# Patient Record
Sex: FEMALE | Race: WHITE | NOT HISPANIC OR LATINO | Employment: OTHER | URBAN - METROPOLITAN AREA
[De-identification: names, ages, dates, MRNs, and addresses within clinical notes are randomized per-mention and may not be internally consistent; named-entity substitution may affect disease eponyms.]

---

## 2023-08-28 ENCOUNTER — TELEPHONE (OUTPATIENT)
Dept: SURGERY | Facility: CLINIC | Age: 78
End: 2023-08-28
Payer: OTHER GOVERNMENT

## 2023-08-28 NOTE — TELEPHONE ENCOUNTER
New patient chart prepared for Dr. Marzena Benedict to review.   New patient referral for personal history of breast cancer-[place].     Left patient a message to call me back.

## 2023-08-30 ENCOUNTER — TELEPHONE (OUTPATIENT)
Dept: SURGERY | Facility: CLINIC | Age: 78
End: 2023-08-30
Payer: OTHER GOVERNMENT

## 2023-08-30 NOTE — TELEPHONE ENCOUNTER
New patient apt scheduled with Dr. Jeni Bojorquez Mon 11/6/23 10:00 am.     New patient packet and apt reminder mailed.

## 2023-11-03 NOTE — PROGRESS NOTES
Breast Surgery:    Oncologic History:  Kaye Aviles is a 78 y.o. female with the following oncologic history:  Oncology/Hematology History   Ductal carcinoma in situ (DCIS) of right breast   8/24/2018 Cancer Staged    Staging form: Breast, AJCC 8th Edition  - Pathologic stage from 8/24/2018: Stage 0 (pTis (DCIS), pN0, cM0, ER-, ND-, HER2: Not Assessed) - Signed by Hanane Bojorquez MD on 11/6/2023 8/24/2018 Surgery    Surgery       Procedure:  Right Radioactive seed localized partial mastectomy      Location:  right      Completeness of resection:   negative margins       10/1/2018 - 12/1/2018 Radiation    Radiation OncologyTreatment Course:  Kaye Aviles received radiation following RIGHT PM, but unknown length of time/total grey received.      7/18/2022 Cancer Staged    Staging form: Breast, AJCC 8th Edition  - Pathologic stage from 9/9/2022: Stage 0 (rpTis (DCIS), pN0(sn), cM0, ER-, ND-, HER2: Not Assessed) - Signed by Hanane Bojorquez MD on 11/6/2023 9/9/2022 Surgery    Surgery       Procedure:  Bilateral Mastectomy, right axillary sentinel lymph node biopsy      Location:  Right DCIS      Completeness of resection:   negative margins       11/6/2023 Initial Diagnosis    Ductal carcinoma in situ (DCIS) of right breast          Interval History: She recently moved from [place] to [place], here today to establish follow up for clinical breast exams. She was initially diagnosed with HR- DCIS in 2018, completed partial mastectomy and radiation. She then had a new HR -, DCIS diagnosis in 2022 and underwent bilateral mastectomies, right sentinel lymph node biopsy with flat closure.     She is otherwise doing well. She has had chronic right sided pain since her initial surgery in 2018, noticed when laying on that side, otherwise it does not bother her. She enjoys walks, attending music events. She and her  moved to the [place] area to be near family after they both had cancer diagnoses recently. Son  is an .    She declined genetic testing when previously offered. Her family does not want to know the results if a mutation were to be found.     GYNECOLOGIC HISTORY:   . P: 2. AB: 2.  Last menstrual period: na  Age at menarche: 12  Age at first childbirth: 29  Lactation: yes  Age at menopause: 49  Total years of oral contraceptive use: 9  Total years of hormone replacement therapy: 2      Past Medical History:   Diagnosis Date    Breast cancer     Hyperlipidemia      Patient Active Problem List   Diagnosis    History of ductal carcinoma in situ of breast    Ductal carcinoma in situ (DCIS) of right breast     Current Outpatient Medications on File Prior to Visit   Medication Sig Dispense Refill    ezetimibe (ZETIA) 10 MG tablet Take 1 tablet by mouth Daily.      ALPRAZolam (XANAX) 0.25 MG tablet Take 1 tablet by mouth Daily As Needed.      Aspirin 325 MG capsule TAKE 1/2 TABLET BY ORAL ROUTE EVERY OTHER DAY      atenolol (TENORMIN) 25 MG tablet TAKE 0.5 TABLETS TWICE A DAY BY ORAL ROUTE FOR 90 DAYS.      Cholecalciferol 50 MCG (2000 UT) tablet Take by oral route.      clonazePAM (KlonoPIN) 1 MG tablet Take 0.5 tablets by mouth 2 (Two) Times a Day.      colesevelam (WELCHOL) 3.75 g pack pack USE 1 PACKET IN LIQUID AND DRINK BY MOUTH EVERY DAY      Diclofenac Sodium (VOLTAREN) 1 % gel gel apply 2-4 gm to affected areas QID      QUEtiapine (SEROquel) 25 MG tablet TAKE 1/4 TO 1/2 TABLET BY MOUTH AT BEDTIME AS NEEDED       No current facility-administered medications on file prior to visit.     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     Family History   Problem Relation Age of Onset    Breast cancer Mother 74    Cancer Father         Review of Systems:  CONSTITUTIONAL: No weight loss, fever, chills, weakness or fatigue.  HEENT: Eyes: No visual loss, blurred vision, double vision or yellow sclerae. Ears, Nose, Throat: No hearing loss, sneezing, congestion, runny nose or sore throat.  SKIN: No rash or itching.  CARDIOVASCULAR: No chest pain, chest pressure or chest discomfort. No palpitations or edema.  RESPIRATORY: No shortness of breath, cough or sputum.  GASTROINTESTINAL: No anorexia, nausea, vomiting or diarrhea. No abdominal pain or blood.  GENITOURINARY: No burning on urination  NEUROLOGICAL: No headache, dizziness, syncope, paralysis, ataxia, numbness or tingling in the extremities. No change in bowel or bladder control.  MUSCULOSKELETAL: No muscle, back pain, joint pain or stiffness.  HEMATOLOGIC: No anemia, bleeding or bruising.  LYMPHATICS: No enlarged nodes.  PSYCHIATRIC: No history of depression or anxiety.  ENDOCRINOLOGIC: No reports of sweating, cold or heat intolerance.   ALLERGIES: No history of asthma, hives, eczema or rhinitis.    Physical Exam:  Vitals:    11/06/23 0942   BP: 148/78   Pulse: 64   SpO2: 99%         General: Alert, cooperative    HEENT: Atraumatic, normocephalic    Oral/Maxillofacial: Moist mucous membranes    Neck: Supple     Lungs: EWOB, clear to auscultation bilaterally     Heart: RRR    Breast: bilateral flat closure of mastectomies, incisions well healed     Lymphatics:  Bilateral supraclavicular, cervical, and axillary basins without lymphadneopathy    Abdomen: Soft, non-tender, non-distended    Extremities: normal strength and sensation.  No obvious deformities.     Neuro: alert, normal speech, no focal findings or movement disorder  noted     Skin: no lesions or abrasions. Sks to left chest wall.       Recent Imaging:  NA    Assessment/Plan: Kaye Aviles is a 78 y.o.female with h/o Stage 0 ER-DC-, Right breast cancer, JEANNETTE  - RTC 6 months for clinical breast exam  - Referral to nurse navigation for support, peer and psych.     Hanane Bojorquez MD  Breast Surgical Oncology

## 2023-11-06 ENCOUNTER — OFFICE VISIT (OUTPATIENT)
Dept: SURGERY | Facility: CLINIC | Age: 78
End: 2023-11-06
Payer: MEDICARE

## 2023-11-06 ENCOUNTER — PATIENT OUTREACH (OUTPATIENT)
Dept: OTHER | Facility: HOSPITAL | Age: 78
End: 2023-11-06
Payer: OTHER GOVERNMENT

## 2023-11-06 VITALS
WEIGHT: 120 LBS | BODY MASS INDEX: 23.56 KG/M2 | HEART RATE: 64 BPM | HEIGHT: 60 IN | DIASTOLIC BLOOD PRESSURE: 78 MMHG | OXYGEN SATURATION: 99 % | SYSTOLIC BLOOD PRESSURE: 148 MMHG

## 2023-11-06 DIAGNOSIS — Z86.000 HISTORY OF DUCTAL CARCINOMA IN SITU OF BREAST: Primary | ICD-10-CM

## 2023-11-06 DIAGNOSIS — D05.11 DUCTAL CARCINOMA IN SITU (DCIS) OF RIGHT BREAST: ICD-10-CM

## 2023-11-06 PROCEDURE — 99203 OFFICE O/P NEW LOW 30 MIN: CPT | Performed by: STUDENT IN AN ORGANIZED HEALTH CARE EDUCATION/TRAINING PROGRAM

## 2023-11-06 PROCEDURE — 1159F MED LIST DOCD IN RCRD: CPT | Performed by: STUDENT IN AN ORGANIZED HEALTH CARE EDUCATION/TRAINING PROGRAM

## 2023-11-06 PROCEDURE — 1160F RVW MEDS BY RX/DR IN RCRD: CPT | Performed by: STUDENT IN AN ORGANIZED HEALTH CARE EDUCATION/TRAINING PROGRAM

## 2023-11-06 RX ORDER — EZETIMIBE 10 MG/1
1 TABLET ORAL DAILY
COMMUNITY
Start: 2023-07-24

## 2023-11-06 RX ORDER — COLESEVELAM HYDROCHLORIDE 3.75 G/1
POWDER, FOR SUSPENSION ORAL
COMMUNITY

## 2023-11-06 RX ORDER — QUETIAPINE FUMARATE 25 MG/1
TABLET, FILM COATED ORAL
COMMUNITY

## 2023-11-06 RX ORDER — CLONAZEPAM 1 MG/1
0.5 TABLET ORAL 2 TIMES DAILY
COMMUNITY

## 2023-11-06 RX ORDER — ATENOLOL 25 MG/1
TABLET ORAL
COMMUNITY

## 2023-11-06 RX ORDER — ALPRAZOLAM 0.25 MG/1
1 TABLET ORAL DAILY PRN
COMMUNITY

## 2023-11-06 NOTE — LETTER
November 6, 2023       No Recipients    Patient: Kaye Aviles   YOB: 1945   Date of Visit: 11/6/2023     Dear Montrell Ye MD:       Thank you for referring Kaye Aviles to me for evaluation. Below are the relevant portions of my assessment and plan of care.    If you have questions, please do not hesitate to call me. I look forward to following Kaye along with you.         Sincerely,        Hanane Bojorquez MD        CC:   No Recipients    Hanane Bojorquez MD  11/06/23 1041  Sign when Signing Visit  Breast Surgery:    Oncologic History:  Kaye Aviles is a 78 y.o. female with the following oncologic history:  Oncology/Hematology History   Ductal carcinoma in situ (DCIS) of right breast   8/24/2018 Cancer Staged    Staging form: Breast, AJCC 8th Edition  - Pathologic stage from 8/24/2018: Stage 0 (pTis (DCIS), pN0, cM0, ER-, AK-, HER2: Not Assessed) - Signed by Hanane Bojorquez MD on 11/6/2023 8/24/2018 Surgery    Surgery       Procedure:  Right Radioactive seed localized partial mastectomy      Location:  right      Completeness of resection:   negative margins       10/1/2018 - 12/1/2018 Radiation    Radiation OncologyTreatment Course:  Kaye Aviles received radiation following RIGHT PM, but unknown length of time/total grey received.      7/18/2022 Cancer Staged    Staging form: Breast, AJCC 8th Edition  - Pathologic stage from 9/9/2022: Stage 0 (rpTis (DCIS), pN0(sn), cM0, ER-, AK-, HER2: Not Assessed) - Signed by Haanne Bojorquez MD on 11/6/2023 9/9/2022 Surgery    Surgery       Procedure:  Bilateral Mastectomy, right axillary sentinel lymph node biopsy      Location:  Right DCIS      Completeness of resection:   negative margins       11/6/2023 Initial Diagnosis    Ductal carcinoma in situ (DCIS) of right breast          Interval History: She recently moved from [place] to [place], here today to establish follow up for clinical breast exams. She was initially diagnosed with HR- DCIS in  2018, completed partial mastectomy and radiation. She then had a new HR -, DCIS diagnosis in  and underwent bilateral mastectomies, right sentinel lymph node biopsy with flat closure.     She is otherwise doing well. She has had chronic right sided pain since her initial surgery in 2018, noticed when laying on that side, otherwise it does not bother her. She enjoys walks, attending music events. She and her  moved to the [facility] to be near family after they both had cancer diagnoses recently. Son is an .    She declined genetic testing when previously offered. Her family does not want to know the results if a mutation were to be found.     GYNECOLOGIC HISTORY:   . P: 2. AB: 2.  Last menstrual period: na  Age at menarche: 12  Age at first childbirth: 29  Lactation: yes  Age at menopause: 49  Total years of oral contraceptive use: 9  Total years of hormone replacement therapy: 2      Past Medical History:   Diagnosis Date   • Breast cancer    • Hyperlipidemia      Patient Active Problem List   Diagnosis   • History of ductal carcinoma in situ of breast   • Ductal carcinoma in situ (DCIS) of right breast     Current Outpatient Medications on File Prior to Visit   Medication Sig Dispense Refill   • ezetimibe (ZETIA) 10 MG tablet Take 1 tablet by mouth Daily.     • ALPRAZolam (XANAX) 0.25 MG tablet Take 1 tablet by mouth Daily As Needed.     • Aspirin 325 MG capsule TAKE 1/2 TABLET BY ORAL ROUTE EVERY OTHER DAY     • atenolol (TENORMIN) 25 MG tablet TAKE 0.5 TABLETS TWICE A DAY BY ORAL ROUTE FOR 90 DAYS.     • Cholecalciferol 50 MCG (2000 UT) tablet Take by oral route.     • clonazePAM (KlonoPIN) 1 MG tablet Take 0.5 tablets by mouth 2 (Two) Times a Day.     • colesevelam (WELCHOL) 3.75 g pack pack USE 1 PACKET IN LIQUID AND DRINK BY MOUTH EVERY DAY     • Diclofenac Sodium (VOLTAREN) 1 % gel gel apply 2-4 gm to affected areas QID     • QUEtiapine (SEROquel) 25 MG tablet TAKE  1/4 TO 1/2 TABLET BY MOUTH AT BEDTIME AS NEEDED       No current facility-administered medications on file prior to visit.     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     Family History   Problem Relation Age of Onset   • Breast cancer Mother 74   • Cancer Father         Review of Systems:  CONSTITUTIONAL: No weight loss, fever, chills, weakness or fatigue.  HEENT: Eyes: No visual loss, blurred vision, double vision or yellow sclerae. Ears, Nose, Throat: No hearing loss, sneezing, congestion, runny nose or sore throat.  SKIN: No rash or itching.  CARDIOVASCULAR: No chest pain, chest pressure or chest discomfort. No palpitations or edema.  RESPIRATORY: No shortness of breath, cough or sputum.  GASTROINTESTINAL: No anorexia, nausea, vomiting or diarrhea. No abdominal pain or blood.  GENITOURINARY: No burning on urination  NEUROLOGICAL: No headache, dizziness, syncope, paralysis, ataxia, numbness or tingling in the extremities. No change in bowel or bladder control.  MUSCULOSKELETAL: No muscle, back pain, joint pain or stiffness.  HEMATOLOGIC: No anemia, bleeding or bruising.  LYMPHATICS: No enlarged nodes.  PSYCHIATRIC: No history of depression or anxiety.  ENDOCRINOLOGIC: No reports of sweating, cold or heat intolerance.   ALLERGIES: No history of asthma, hives, eczema or rhinitis.    Physical Exam:  Vitals:    23 0942   BP: 148/78   Pulse: 64   SpO2: 99%         General: Alert,  cooperative    HEENT: Atraumatic, normocephalic    Oral/Maxillofacial: Moist mucous membranes    Neck: Supple     Lungs: EWOB, clear to auscultation bilaterally     Heart: RRR    Breast: bilateral flat closure of mastectomies, incisions well healed     Lymphatics:  Bilateral supraclavicular, cervical, and axillary basins without lymphadneopathy    Abdomen: Soft, non-tender, non-distended    Extremities: normal strength and sensation.  No obvious deformities.     Neuro: alert, normal speech, no focal findings or movement disorder noted     Skin: no lesions or abrasions. Sks to left chest wall.       Recent Imaging:  NA    Assessment/Plan: Kaye Aviles is a 78 y.o.female with h/o Stage 0 ER-MS-, Right breast cancer, JEANNETTE  - RTC 6 months for clinical breast exam  - Referral to nurse navigation for support, peer and psych.     Hanane Bojorquez MD  Breast Surgical Oncology

## 2023-11-06 NOTE — PROGRESS NOTES
Referral received from Dr. Bojorquez's office. I called Ms. Aviles and introduced myself and navigational services. She stated the consult with Dr. Bojorquez went well and she is establishing care from [place]. She has a history of breast cancer times 2 and had a double mastectomy in 2022. She was interested in support groups and we discussed [employer] and [employer]. She was interested in more information and that will be sent.      We discussed integrative therapies and other services [facility]. She will received a navigation folder with the following information in the mail:     [facility] Support [facility], Cancer and Restorative Exercise (CARE), [employer] Exercise program, Guide for the Newly Diagnosed, [employer], [facility], Massage Therapy, Reiki Therapy, [employer] [place], Cancer Nutrition, and Survivorship Clinic.     She verbalized appreciation for navigational services and she has my contact information and will call with any questions that arise.

## 2023-11-09 ENCOUNTER — PATIENT ROUNDING (BHMG ONLY) (OUTPATIENT)
Dept: SURGERY | Facility: CLINIC | Age: 78
End: 2023-11-09
Payer: OTHER GOVERNMENT

## 2023-11-09 NOTE — PROGRESS NOTES
November 9, 2023    Hello, may I speak with Kaye Aviles?    My name is Francheska      I am  with [facility]  [address] [address] [phone].    Before we get started may I verify your date of birth? 1945    I am calling to officially welcome you to our practice and ask about your recent visit. Is this a good time to talk? Done in clinic    Tell me about your visit with us. What things went well?  Everything went well.  Pt understood treatment plan     We're always looking for ways to make our patients' experiences even better. Do you have recommendations on ways we may improve?  no    Overall were you satisfied with your first visit to our practice? yes       I appreciate you taking the time to speak with me today. Is there anything else I can do for you? no      Thank you, and have a great day.

## 2023-12-13 ENCOUNTER — PATIENT OUTREACH (OUTPATIENT)
Dept: OTHER | Facility: HOSPITAL | Age: 78
End: 2023-12-13
Payer: OTHER GOVERNMENT

## 2023-12-13 NOTE — PROGRESS NOTES
Called MsGuy mitch to see how she was doing. Left a message with my contact information and asked her to call back at her convenience.

## 2024-05-06 ENCOUNTER — TELEPHONE (OUTPATIENT)
Dept: SURGERY | Facility: CLINIC | Age: 79
End: 2024-05-06
Payer: OTHER GOVERNMENT

## 2024-05-06 ENCOUNTER — OFFICE VISIT (OUTPATIENT)
Dept: SURGERY | Facility: CLINIC | Age: 79
End: 2024-05-06
Payer: MEDICARE

## 2024-05-06 VITALS
DIASTOLIC BLOOD PRESSURE: 84 MMHG | RESPIRATION RATE: 18 BRPM | WEIGHT: 120 LBS | OXYGEN SATURATION: 98 % | HEART RATE: 69 BPM | SYSTOLIC BLOOD PRESSURE: 142 MMHG | BODY MASS INDEX: 23.56 KG/M2 | HEIGHT: 60 IN

## 2024-05-06 DIAGNOSIS — D05.11 DUCTAL CARCINOMA IN SITU (DCIS) OF RIGHT BREAST: Primary | ICD-10-CM

## 2024-05-06 PROCEDURE — 99213 OFFICE O/P EST LOW 20 MIN: CPT | Performed by: STUDENT IN AN ORGANIZED HEALTH CARE EDUCATION/TRAINING PROGRAM

## 2024-05-06 PROCEDURE — 1160F RVW MEDS BY RX/DR IN RCRD: CPT | Performed by: STUDENT IN AN ORGANIZED HEALTH CARE EDUCATION/TRAINING PROGRAM

## 2024-05-06 PROCEDURE — 1159F MED LIST DOCD IN RCRD: CPT | Performed by: STUDENT IN AN ORGANIZED HEALTH CARE EDUCATION/TRAINING PROGRAM

## 2024-05-07 ENCOUNTER — PATIENT OUTREACH (OUTPATIENT)
Dept: OTHER | Facility: HOSPITAL | Age: 79
End: 2024-05-07
Payer: OTHER GOVERNMENT

## 2024-06-19 ENCOUNTER — PATIENT OUTREACH (OUTPATIENT)
Dept: OTHER | Facility: HOSPITAL | Age: 79
End: 2024-06-19
Payer: OTHER GOVERNMENT

## 2024-06-19 NOTE — PROGRESS NOTES
Called MsGuy mitch to see how she was doing. Left a message with my contact information and asked her to call back at her convenience. Will sign off case for now.

## 2024-11-14 ENCOUNTER — OFFICE VISIT (OUTPATIENT)
Dept: SURGERY | Facility: CLINIC | Age: 79
End: 2024-11-14
Payer: MEDICARE

## 2024-11-14 VITALS
OXYGEN SATURATION: 98 % | BODY MASS INDEX: 23.56 KG/M2 | SYSTOLIC BLOOD PRESSURE: 146 MMHG | WEIGHT: 120 LBS | HEART RATE: 64 BPM | RESPIRATION RATE: 18 BRPM | HEIGHT: 60 IN | DIASTOLIC BLOOD PRESSURE: 82 MMHG

## 2024-11-14 DIAGNOSIS — D05.11 DUCTAL CARCINOMA IN SITU (DCIS) OF RIGHT BREAST: Primary | ICD-10-CM

## 2024-11-14 DIAGNOSIS — Z86.000 HISTORY OF DUCTAL CARCINOMA IN SITU OF BREAST: ICD-10-CM

## 2024-11-14 NOTE — LETTER
November 14, 2024     [place]  [id number] [address] [place] [address]    Patient: Kaye Aviles   YOB: 1945   Date of Visit: 11/14/2024     Dear James William Bosler III, MD:       Thank you for referring Kaye Aviles to me for evaluation. Below are the relevant portions of my assessment and plan of care.    If you have questions, please do not hesitate to call me. I look forward to following Kaye along with you.         Sincerely,        Hanane Bojorquez MD        CC: No Recipients    Hanane Bojorquez MD  11/14/24 1001  Sign when Signing Visit  Breast Surgery Follow Up Note    Oncologic History:  Kaye Aviles is a 79 y.o. female with the following oncologic history:  Oncology/Hematology History   Ductal carcinoma in situ (DCIS) of right breast   8/24/2018 Cancer Staged    Staging form: Breast, AJCC 8th Edition  - Pathologic stage from 8/24/2018: Stage 0 (pTis (DCIS), pN0, cM0, ER-, CT-, HER2: Not Assessed) - Signed by Hanane Bojorquez MD on 11/6/2023 8/24/2018 Surgery    Surgery       Procedure:  Right Radioactive seed localized partial mastectomy      Location:  right      Completeness of resection:   negative margins       10/1/2018 - 12/1/2018 Radiation    Radiation OncologyTreatment Course:  Kaye Aviles received radiation following RIGHT PM, but unknown length of time/total grey received.      7/18/2022 Cancer Staged    Staging form: Breast, AJCC 8th Edition  - Pathologic stage from 9/9/2022: Stage 0 (rpTis (DCIS), pN0(sn), cM0, ER-, CT-, HER2: Not Assessed) - Signed by Hanane Bojorquez MD on 11/6/2023 9/9/2022 Surgery    Surgery       Procedure:  Bilateral Mastectomy, right axillary sentinel lymph node biopsy      Location:  Right DCIS      Completeness of resection:   negative margins       11/6/2023 Initial Diagnosis    Ductal carcinoma in situ (DCIS) of right breast          Interval History: she has been doing well, no change to her overall health. Has notices some changes to  her left chest wall skin lesions. Has scheduled an appointment with dermatology in the coming weeks to have this evaluated.     Past Medical History:   Diagnosis Date   • Breast cancer    • Hyperlipidemia      Patient Active Problem List   Diagnosis   • History of ductal carcinoma in situ of breast   • Ductal carcinoma in situ (DCIS) of right breast     Current Outpatient Medications on File Prior to Visit   Medication Sig Dispense Refill   • Aspirin 325 MG capsule TAKE 1/2 TABLET BY ORAL ROUTE EVERY OTHER DAY     • atenolol (TENORMIN) 25 MG tablet TAKE 0.5 TABLETS TWICE A DAY BY ORAL ROUTE FOR 90 DAYS.     • Cholecalciferol 50 MCG (2000 UT) tablet Take by oral route.     • clonazePAM (KlonoPIN) 1 MG tablet Take 0.5 tablets by mouth 2 (Two) Times a Day.     • colesevelam (WELCHOL) 3.75 g pack pack USE 1 PACKET IN LIQUID AND DRINK BY MOUTH EVERY DAY     • Diclofenac Sodium (VOLTAREN) 1 % gel gel apply 2-4 gm to affected areas QID     • ezetimibe (ZETIA) 10 MG tablet Take 1 tablet by mouth Daily.     • QUEtiapine (SEROquel) 25 MG tablet TAKE 1/4 TO 1/2 TABLET BY MOUTH AT BEDTIME AS NEEDED     • ALPRAZolam (XANAX) 0.25 MG tablet Take 1 tablet by mouth Daily As Needed. (Patient not taking: Reported on 2024)       No current facility-administered medications on file prior to visit.     Allergies   Allergen Reactions   • Bee Venom Anaphylaxis   • Escitalopram Diarrhea   • Tetracyclines & Related Other (See Comments)       Past Surgical History:   Procedure Laterality Date   • BREAST LUMPECTOMY Right     DCIS   •  SECTION     • DILATION AND CURETTAGE, DIAGNOSTIC / THERAPEUTIC      Miscarriage   • MASTECTOMY RADICAL Bilateral     DCIS in right breast     Social History     Socioeconomic History   • Marital status:    Tobacco Use   • Smoking status: Never     Family History   Problem Relation Age of Onset   • Breast cancer Mother 74   • Cancer Father         Review of Systems:  CONSTITUTIONAL: No  weight loss, fever, chills, weakness or fatigue.  HEENT: Eyes: No visual loss, blurred vision, double vision or yellow sclerae. Ears, Nose, Throat: No hearing loss, sneezing, congestion, runny nose or sore throat.  SKIN: No rash or itching.  CARDIOVASCULAR: No chest pain, chest pressure or chest discomfort. No palpitations or edema.  RESPIRATORY: No shortness of breath, cough or sputum.  GASTROINTESTINAL: No anorexia, nausea, vomiting or diarrhea. No abdominal pain or blood.  GENITOURINARY: No burning on urination  NEUROLOGICAL: No headache, dizziness, syncope, paralysis, ataxia, numbness or tingling in the extremities. No change in bowel or bladder control.  MUSCULOSKELETAL: No muscle, back pain, joint pain or stiffness.  HEMATOLOGIC: No anemia, bleeding or bruising.  LYMPHATICS: No enlarged nodes.  PSYCHIATRIC: No history of depression or anxiety.  ENDOCRINOLOGIC: No reports of sweating, cold or heat intolerance. No polyuria or polydipsia.  ALLERGIES: No history of asthma, hives, eczema or rhinitis.    Physical Exam:  Vitals:    11/14/24 0919   BP: 146/82   Pulse: 64   Resp: 18   SpO2: 98%         General: Alert, cooperative, anxious    HEENT: Atraumatic, normocephalic    Oral/Maxillofacial: Moist mucous membranes    Neck: Supple     Lungs: EWOB, clear to auscultation bilaterally     Heart: RRR    Breast: bilateral flat closure of mastectomies, incisions well healed. Skin now appears symmetric, no significant edema. Left superior to incision, several small Actinic keratosis, overall stable from prior exam    Lymphatics:  Bilateral supraclavicular, cervical, and axillary basins without lymphadneopathy    Abdomen: Soft, non-tender, non-distended    Extremities: normal strength and sensation.  No obvious deformities.     Neuro: alert, normal speech, no focal findings or movement disorder noted     Skin: no lesions or abrasions      Recent Imaging:  NA      Assessment/Plan: Kaye Aviles is a 79 y.o.female with h/o  Stage 0, recurrent right breast cancer, JEANNETTE. Final surgery was completed September 2022.  - RTC 6 year for clinical breast exam, she continues with significant healthcare anxiety and fear for recurrence.     - continue to do self checks of her chest skin.  - established with dermatology, reassuring exam - seen by Dr. Salguero. She is interested in getting skin lesions removed and will follow up.  - her  has been hospitalized multiple times and she remains anxious about health issues due to her self and her family. She is relieved with reassuring exam today.      Hanane Bojorquez MD  Breast Surgical Oncology  I spent 20 minutes caring for Ms. Aviles on this date of service. This time includes time spent by me in the following activities: preparing for the visit, obtaining and/or reviewing a separately obtained history, performing a medically appropriate examination and/or evaluation , counseling and educating the patient/family/caregiver, referring and communicating with other health care professionals , documenting information in the medical record, independently interpreting results and communicating that information with the patient/family/caregiver, and care coordination.

## 2024-11-14 NOTE — PROGRESS NOTES
Breast Surgery Follow Up Note    Oncologic History:  Kaye Aviles is a 79 y.o. female with the following oncologic history:  Oncology/Hematology History   Ductal carcinoma in situ (DCIS) of right breast   8/24/2018 Cancer Staged    Staging form: Breast, AJCC 8th Edition  - Pathologic stage from 8/24/2018: Stage 0 (pTis (DCIS), pN0, cM0, ER-, [place]-, HER2: Not Assessed) - Signed by Hanane Bojorquez MD on 11/6/2023 8/24/2018 Surgery    Surgery       Procedure:  Right Radioactive seed localized partial mastectomy      Location:  right      Completeness of resection:   negative margins       10/1/2018 - 12/1/2018 Radiation    Radiation OncologyTreatment Course:  Kaye Aviles received radiation following RIGHT PM, but unknown length of time/total grey received.      7/18/2022 Cancer Staged    Staging form: Breast, AJCC 8th Edition  - Pathologic stage from 9/9/2022: Stage 0 (rpTis (DCIS), pN0(sn), cM0, ER-, [place]-, HER2: Not Assessed) - Signed by Hanane Bojorquez MD on 11/6/2023 9/9/2022 Surgery    Surgery       Procedure:  Bilateral Mastectomy, right axillary sentinel lymph node biopsy      Location:  Right DCIS      Completeness of resection:   negative margins       11/6/2023 Initial Diagnosis    Ductal carcinoma in situ (DCIS) of right breast          Interval History: she has been doing well, no change to her overall health. Has notices some changes to her left chest wall skin lesions. Has scheduled an appointment with dermatology in the coming weeks to have this evaluated.     Past Medical History:   Diagnosis Date    Breast cancer     Hyperlipidemia      Patient Active Problem List   Diagnosis    History of ductal carcinoma in situ of breast    Ductal carcinoma in situ (DCIS) of right breast     Current Outpatient Medications on File Prior to Visit   Medication Sig Dispense Refill    Aspirin 325 MG capsule TAKE 1/2 TABLET BY ORAL ROUTE EVERY OTHER DAY      atenolol (TENORMIN) 25 MG tablet TAKE 0.5 TABLETS TWICE A  DAY BY ORAL ROUTE FOR 90 DAYS.      Cholecalciferol 50 MCG (2000 UT) tablet Take by oral route.      clonazePAM (KlonoPIN) 1 MG tablet Take 0.5 tablets by mouth 2 (Two) Times a Day.      colesevelam (WELCHOL) 3.75 g pack pack USE 1 PACKET IN LIQUID AND DRINK BY MOUTH EVERY DAY      Diclofenac Sodium (VOLTAREN) 1 % gel gel apply 2-4 gm to affected areas QID      ezetimibe (ZETIA) 10 MG tablet Take 1 tablet by mouth Daily.      QUEtiapine (SEROquel) 25 MG tablet TAKE 1/4 TO 1/2 TABLET BY MOUTH AT BEDTIME AS NEEDED      ALPRAZolam (XANAX) 0.25 MG tablet Take 1 tablet by mouth Daily As Needed. (Patient not taking: Reported on 2024)       No current facility-administered medications on file prior to visit.     Allergies   Allergen Reactions    Bee Venom Anaphylaxis    Escitalopram Diarrhea    Tetracyclines & Related Other (See Comments)       Past Surgical History:   Procedure Laterality Date    BREAST LUMPECTOMY Right     DCIS     SECTION      DILATION AND CURETTAGE, DIAGNOSTIC / THERAPEUTIC      Miscarriage    MASTECTOMY RADICAL Bilateral     DCIS in right breast     Social History     Socioeconomic History    Marital status:    Tobacco Use    Smoking status: Never     Family History   Problem Relation Age of Onset    Breast cancer Mother 74    Cancer Father         Review of Systems:  CONSTITUTIONAL: No weight loss, fever, chills, weakness or fatigue.  HEENT: Eyes: No visual loss, blurred vision, double vision or yellow sclerae. Ears, Nose, Throat: No hearing loss, sneezing, congestion, runny nose or sore throat.  SKIN: No rash or itching.  CARDIOVASCULAR: No chest pain, chest pressure or chest discomfort. No palpitations or edema.  RESPIRATORY: No shortness of breath, cough or sputum.  GASTROINTESTINAL: No anorexia, nausea, vomiting or diarrhea. No abdominal pain or blood.  GENITOURINARY: No burning on urination  NEUROLOGICAL: No headache, dizziness, syncope, paralysis, ataxia, numbness  or tingling in the extremities. No change in bowel or bladder control.  MUSCULOSKELETAL: No muscle, back pain, joint pain or stiffness.  HEMATOLOGIC: No anemia, bleeding or bruising.  LYMPHATICS: No enlarged nodes.  PSYCHIATRIC: No history of depression or anxiety.  ENDOCRINOLOGIC: No reports of sweating, cold or heat intolerance. No polyuria or polydipsia.  ALLERGIES: No history of asthma, hives, eczema or rhinitis.    Physical Exam:  Vitals:    11/14/24 0919   BP: 146/82   Pulse: 64   Resp: 18   SpO2: 98%         General: Alert, cooperative, anxious    HEENT: Atraumatic, normocephalic    Oral/Maxillofacial: Moist mucous membranes    Neck: Supple     Lungs: EWOB, clear to auscultation bilaterally     Heart: RRR    Breast: bilateral flat closure of mastectomies, incisions well healed. Skin now appears symmetric, no significant edema. Left superior to incision, several small Actinic keratosis, overall stable from prior exam    Lymphatics:  Bilateral supraclavicular, cervical, and axillary basins without lymphadneopathy    Abdomen: Soft, non-tender, non-distended    Extremities: normal strength and sensation.  No obvious deformities.     Neuro: alert, normal speech, no focal findings or movement disorder noted     Skin: no lesions or abrasions      Recent Imaging:  NA      Assessment/Plan: Kaye Aviles is a 79 y.o.female with h/o Stage 0, recurrent right breast cancer, JEANNETTE. Final surgery was completed September 2022.  - RTC 6 year for clinical breast exam, she continues with significant healthcare anxiety and fear for recurrence.     - continue to do self checks of her chest skin.  - established with dermatology, reassuring exam - seen by Dr. Salguero. She is interested in getting skin lesions removed and will follow up.  - her  has been hospitalized multiple times and she remains anxious about health issues due to her self and her family. She is relieved with reassuring exam today.      Hanane Bojorquez MD  Breast  Surgical Oncology  I spent 20 minutes caring for Ms. Aviles on this date of service. This time includes time spent by me in the following activities: preparing for the visit, obtaining and/or reviewing a separately obtained history, performing a medically appropriate examination and/or evaluation , counseling and educating the patient/family/caregiver, referring and communicating with other health care professionals , documenting information in the medical record, independently interpreting results and communicating that information with the patient/family/caregiver, and care coordination.

## 2025-03-17 ENCOUNTER — TELEPHONE (OUTPATIENT)
Dept: SURGERY | Facility: CLINIC | Age: 80
End: 2025-03-17
Payer: OTHER GOVERNMENT

## 2025-04-11 ENCOUNTER — LAB (OUTPATIENT)
Dept: LAB | Facility: HOSPITAL | Age: 80
End: 2025-04-11

## 2025-05-14 ENCOUNTER — OFFICE VISIT (OUTPATIENT)
Dept: SURGERY | Facility: CLINIC | Age: 80
End: 2025-05-14
Payer: MEDICARE

## 2025-05-14 VITALS
BODY MASS INDEX: 25.4 KG/M2 | OXYGEN SATURATION: 99 % | HEART RATE: 66 BPM | WEIGHT: 129.4 LBS | HEIGHT: 60 IN | DIASTOLIC BLOOD PRESSURE: 84 MMHG | SYSTOLIC BLOOD PRESSURE: 148 MMHG

## 2025-05-14 DIAGNOSIS — D05.11 DUCTAL CARCINOMA IN SITU (DCIS) OF RIGHT BREAST: Primary | ICD-10-CM

## 2025-05-14 PROBLEM — F41.9 ANXIETY: Status: ACTIVE | Noted: 2018-12-13

## 2025-05-14 PROBLEM — E78.00 HYPERCHOLESTEROLEMIA: Status: ACTIVE | Noted: 2024-12-30

## 2025-05-14 PROBLEM — I10 ESSENTIAL HYPERTENSION: Status: ACTIVE | Noted: 2018-12-13

## 2025-05-14 PROBLEM — R03.0 ELEVATED BLOOD-PRESSURE READING WITHOUT DIAGNOSIS OF HYPERTENSION: Status: ACTIVE | Noted: 2018-03-16

## 2025-05-14 NOTE — PROGRESS NOTES
Breast Surgery Follow Up Note    Oncologic History:  Kaye Aviles is a 79 y.o. female with the following oncologic history:  Oncology/Hematology History   Ductal carcinoma in situ (DCIS) of right breast   8/24/2018 Cancer Staged    Staging form: Breast, AJCC 8th Edition  - Pathologic stage from 8/24/2018: Stage 0 (pTis (DCIS), pN0, cM0, ER-, [place]-, HER2: Not Assessed) - Signed by Hanane Bojorquez MD on 11/6/2023 8/24/2018 Surgery    Surgery       Procedure:  Right Radioactive seed localized partial mastectomy      Location:  right      Completeness of resection:   negative margins       10/1/2018 - 12/1/2018 Radiation    Radiation OncologyTreatment Course:  Kaye Aviles received radiation following RIGHT PM, but unknown length of time/total grey received.      7/18/2022 Cancer Staged    Staging form: Breast, AJCC 8th Edition  - Pathologic stage from 9/9/2022: Stage 0 (rpTis (DCIS), pN0(sn), cM0, ER-, IN-, HER2: Not Assessed) - Signed by Hanane Bojorquez MD on 11/6/2023 9/9/2022 Surgery    Surgery       Procedure:  Bilateral Mastectomy, right axillary sentinel lymph node biopsy      Location:  Right DCIS      Completeness of resection:   negative margins       11/6/2023 Initial Diagnosis    Ductal carcinoma in situ (DCIS) of right breast          Interval History: she has been doing well, no change to her overall health. Left skin changes previously worked up by her dermatologist and found to be additional sites of actinic keratosis.  No concerns for breast or skin cancer at the sites.  There has been no change in the overall distribution, number or character of her existing sites.  She has noted intermittent right rib pain when laying down to go to sleep.  This is improved by positioning extra sheets/blankets/ or pillows in the area. Her  had been quite ill over the last couple of months, complcations from pelvic radiation for prostate cancer required hospitalization and prolonged follow up/treatments  with urology. He is stable now but continues to be an additional stressor.      Past Medical History:   Diagnosis Date    Breast cancer     Hyperlipidemia      Patient Active Problem List   Diagnosis    History of ductal carcinoma in situ of breast    Ductal carcinoma in situ (DCIS) of right breast     Current Outpatient Medications on File Prior to Visit   Medication Sig Dispense Refill    atenolol (TENORMIN) 25 MG tablet TAKE 0.5 TABLETS TWICE A DAY BY ORAL ROUTE FOR 90 DAYS.      Cholecalciferol 50 MCG (2000 UT) tablet Take by oral route.      clonazePAM (KlonoPIN) 1 MG tablet Take 0.5 tablets by mouth 2 (Two) Times a Day.      colesevelam (WELCHOL) 3.75 g pack pack USE 1 PACKET IN LIQUID AND DRINK BY MOUTH EVERY DAY      Diclofenac Sodium (VOLTAREN) 1 % gel gel apply 2-4 gm to affected areas QID      ezetimibe (ZETIA) 10 MG tablet Take 1 tablet by mouth Daily.      QUEtiapine (SEROquel) 25 MG tablet TAKE 1/4 TO 1/2 TABLET BY MOUTH AT BEDTIME AS NEEDED      ALPRAZolam (XANAX) 0.25 MG tablet Take 1 tablet by mouth Daily As Needed. (Patient not taking: Reported on 2024)      Aspirin 325 MG capsule TAKE 1/2 TABLET BY ORAL ROUTE EVERY OTHER DAY (Patient not taking: Reported on 2025)       No current facility-administered medications on file prior to visit.     Allergies   Allergen Reactions    Bee Venom Anaphylaxis    Escitalopram Diarrhea    Tetracyclines & Related Other (See Comments)       Past Surgical History:   Procedure Laterality Date    BREAST LUMPECTOMY Right     DCIS     SECTION      DILATION AND CURETTAGE, DIAGNOSTIC / THERAPEUTIC      Miscarriage    MASTECTOMY RADICAL Bilateral     DCIS in right breast     Social History     Socioeconomic History    Marital status:    Tobacco Use    Smoking status: Never    Smokeless tobacco: Never   Vaping Use    Vaping status: Never Used     Family History   Problem Relation Age of Onset    Breast cancer Mother 74    Cancer Father          Review of Systems:  CONSTITUTIONAL: No weight loss, fever, chills, weakness or fatigue.  HEENT: Eyes: No visual loss, blurred vision, double vision or yellow sclerae. Ears, Nose, Throat: No hearing loss, sneezing, congestion, runny nose or sore throat.  SKIN: No rash or itching.  CARDIOVASCULAR: No chest pain, chest pressure or chest discomfort. No palpitations or edema.  RESPIRATORY: No shortness of breath, cough or sputum.  GASTROINTESTINAL: No anorexia, nausea, vomiting or diarrhea. No abdominal pain or blood.  GENITOURINARY: No burning on urination  NEUROLOGICAL: No headache, dizziness, syncope, paralysis, ataxia, numbness or tingling in the extremities. No change in bowel or bladder control.  MUSCULOSKELETAL: No muscle, back pain, joint pain or stiffness.  HEMATOLOGIC: No anemia, bleeding or bruising.  LYMPHATICS: No enlarged nodes.  PSYCHIATRIC: No history of depression or anxiety.  ENDOCRINOLOGIC: No reports of sweating, cold or heat intolerance. No polyuria or polydipsia.  ALLERGIES: No history of asthma, hives, eczema or rhinitis.    Physical Exam:  Vitals:    05/14/25 1234   BP: 148/84   Pulse: 66   SpO2: 99%           General: Alert, cooperative, anxious    HEENT: Atraumatic, normocephalic    Oral/Maxillofacial: Moist mucous membranes    Neck: Supple     Lungs: EWOB, clear to auscultation bilaterally     Heart: RRR    Breast: bilateral flat closure of mastectomies, incisions well healed. Skin appears symmetric, no significant edema. Left superior to incision, several small Actinic keratosis, overall stable from prior exam    Lymphatics:  Bilateral supraclavicular, cervical, and axillary basins without lymphadneopathy    Abdomen: Soft, non-tender, non-distended    Extremities: normal strength and sensation.  No obvious deformities.     Neuro: alert, normal speech, no focal findings or movement disorder noted     Skin: no lesions or abrasions      Recent Imaging:  NA      Assessment/Plan: Kaye Aviles  is a 79 y.o.female with h/o Stage 0, recurrent right breast cancer, JEANNETTE. Final surgery, bilateral mastectomies and right sentinel lymph node biopsy, was completed September 2022 - in [place]. Now almost 3 years out from surgery.  - RTC 1 year for clinical breast exam, she continues with significant healthcare anxiety and fear for recurrence.  Is hesitant to proceed with annual exams but the data and ongoing recommendations for annual exams for patients with non-invasive breast cancer treated with mastectomy, clinical practice would support transitioning to annual exams for skin checks.   - would most benefit from supportive oncology, is involved in support groups but not with local people and frequently hears of all the bad events happening in rare circumstances. Needs additional support for general anxiety and health care anxiety.   - continue to do self checks of her chest skin.  - established with dermatology, reassuring exam - seen by Dr. Salguero. She is interested in getting skin lesions removed and will follow up.  - her  has been hospitalized multiple times and she remains anxious about health issues due to her self and her family. She is relieved with reassuring exam today.  - referral to nurse navigation to discuss ongoing cancer support services previously placed.      Hanane Bojorquez MD  Breast Surgical Oncology  I spent 20 minutes caring for Ms. Aviles on this date of service. This time includes time spent by me in the following activities: preparing for the visit, obtaining and/or reviewing a separately obtained history, performing a medically appropriate examination and/or evaluation , counseling and educating the patient/family/caregiver, referring and communicating with other health care professionals , documenting information in the medical record, independently interpreting results and communicating that information with the patient/family/caregiver, and care coordination.